# Patient Record
Sex: MALE | Race: WHITE | URBAN - METROPOLITAN AREA
[De-identification: names, ages, dates, MRNs, and addresses within clinical notes are randomized per-mention and may not be internally consistent; named-entity substitution may affect disease eponyms.]

---

## 2017-08-21 ENCOUNTER — HOSPITAL ENCOUNTER (EMERGENCY)
Facility: CLINIC | Age: 22
Discharge: HOME | End: 2017-08-21
Attending: PSYCHIATRY & NEUROLOGY | Admitting: PSYCHIATRY & NEUROLOGY
Payer: COMMERCIAL

## 2017-08-21 VITALS
WEIGHT: 150 LBS | TEMPERATURE: 98.5 F | HEART RATE: 78 BPM | DIASTOLIC BLOOD PRESSURE: 74 MMHG | RESPIRATION RATE: 16 BRPM | SYSTOLIC BLOOD PRESSURE: 127 MMHG | OXYGEN SATURATION: 97 %

## 2017-08-21 DIAGNOSIS — F31.30 BIPOLAR I DISORDER, MOST RECENT EPISODE DEPRESSED (H): ICD-10-CM

## 2017-08-21 LAB
AMPHETAMINES UR QL SCN: NEGATIVE
BARBITURATES UR QL: NEGATIVE
BENZODIAZ UR QL: NEGATIVE
CANNABINOIDS UR QL SCN: NEGATIVE
COCAINE UR QL: NEGATIVE
ETHANOL UR QL SCN: NEGATIVE
OPIATES UR QL SCN: NEGATIVE

## 2017-08-21 PROCEDURE — 80320 DRUG SCREEN QUANTALCOHOLS: CPT | Performed by: PSYCHIATRY & NEUROLOGY

## 2017-08-21 PROCEDURE — 99284 EMERGENCY DEPT VISIT MOD MDM: CPT | Mod: Z6 | Performed by: PSYCHIATRY & NEUROLOGY

## 2017-08-21 PROCEDURE — 90791 PSYCH DIAGNOSTIC EVALUATION: CPT

## 2017-08-21 PROCEDURE — 80307 DRUG TEST PRSMV CHEM ANLYZR: CPT | Performed by: PSYCHIATRY & NEUROLOGY

## 2017-08-21 PROCEDURE — 99285 EMERGENCY DEPT VISIT HI MDM: CPT | Mod: 25 | Performed by: PSYCHIATRY & NEUROLOGY

## 2017-08-21 ASSESSMENT — ENCOUNTER SYMPTOMS
HALLUCINATIONS: 0
CARDIOVASCULAR NEGATIVE: 1
EYES NEGATIVE: 1
ENDOCRINE NEGATIVE: 1
NEUROLOGICAL NEGATIVE: 1
HEMATOLOGIC/LYMPHATIC NEGATIVE: 1
GASTROINTESTINAL NEGATIVE: 1
RESPIRATORY NEGATIVE: 1
DECREASED CONCENTRATION: 1
SLEEP DISTURBANCE: 1
NERVOUS/ANXIOUS: 1
HYPERACTIVE: 0
MUSCULOSKELETAL NEGATIVE: 1
CONSTITUTIONAL NEGATIVE: 1

## 2017-08-21 NOTE — ED AVS SNAPSHOT
[facility], [place], Emergency Department    [address]    [place] [address]    Phone:  [phone]    Fax:  [phone]                                       Kameron Manning   MRN: [id number]    Department:  [facility], Emergency Department   Date of Visit:  8/21/2017           After Visit Summary Signature Page     I have received my discharge instructions, and my questions have been answered. I have discussed any challenges I see with this plan with the nurse or doctor.    ..........................................................................................................................................  Patient/Patient Representative Signature      ..........................................................................................................................................  Patient Representative Print Name and Relationship to Patient    ..................................................               ................................................  Date                                            Time    ..........................................................................................................................................  Reviewed by Signature/Title    ...................................................              ..............................................  Date                                                            Time

## 2017-08-21 NOTE — ED AVS SNAPSHOT
[facility], Emergency Department    [address]    [place] [address]    Phone:  [phone]    Fax:  [phone]                                       Kameron Manning   MRN: [id number]    Department:  [facility], Emergency Department   Date of Visit:  8/21/2017           Patient Information     Date Of Birth          1995        Your diagnoses for this visit were:     Bipolar I disorder, most recent episode depressed (H)        You were seen by Kishor Pearson MD.      Follow-up Information     Follow up with No Ref-Primary, Physician.        Discharge Instructions       Wellbutrin may be contributing to worse anxiety and restlessness. You can stop it without titrating as you have been on it for only a week.   Follow-up with Dr Howard for further med management and symptom stabilization  Follow-up with [facility]-referred therapy  Follow-up established care and services  If you are not getting better or are getting worse, please return for further evaluation. You may need to be in a more intensive day programming.    24 Hour Appointment Hotline       To make an appointment at any [facility] clinic, call [phone] ([phone]). If you don't have a family doctor or clinic, we will help you find one. [facility] clinics are conveniently located to serve the needs of you and your family.             Review of your medicines      Our records show that you are taking the medicines listed below. If these are incorrect, please call your family doctor or clinic.        Dose / Directions Last dose taken    LAMICTAL PO   Dose:  200 mg        Take 200 mg by mouth daily   Refills:  0        WELLBUTRIN PO   Dose:  150 mg        Take 150 mg by mouth daily   Refills:  0        XANAX PO   Dose:  0.25 mg        Take 0.25 mg by mouth   Refills:  0                Procedures and tests performed during your visit     Drug abuse screen 6 urine (tox)      Orders Needing Specimen Collection     None      Pending  "Results     Date and Time Order Name Status Description    2017 2251 Drug abuse screen 6 urine (tox) In process             Pending Culture Results     Date and Time Order Name Status Description    2017 2251 Drug abuse screen 6 urine (tox) In process             Pending Results Instructions     If you had any lab results that were not finalized at the time of your Discharge, you can call the ED Lab Result RN at [phone]. You will be contacted by this team for any positive Lab results or changes in treatment. The nurses are available 7 days a week from 10A to 6:30P.  You can leave a message 24 hours per day and they will return your call.        Thank you for choosing [facility]       Thank you for choosing [facility] for your care. Our goal is always to provide you with excellent care. Hearing back from our patients is one way we can continue to improve our services. Please take a few minutes to complete the written survey that you may receive in the mail after you visit with us. Thank you!        [email or web address] Information     [employer] lets you send messages to your doctor, view your test results, renew your prescriptions, schedule appointments and more. To sign up, go to [email or web address] . Click on \"Log in\" on the left side of the screen, which will take you to the Welcome page. Then click on \"Sign up Now\" on the right side of the page.     You will be asked to enter the access code listed below, as well as some personal information. Please follow the directions to create your username and password.     Your access code is: [email or web address]  Expires: 2017 11:13 PM     Your access code will  in 90 days. If you need help or a new code, please call your [facility] clinic or [phone].        Care EveryWhere ID     This is your Care EveryWhere ID. This could be used by other organizations to access your [facility] medical records  [id number]        Equal Access to Services     DL FISCHER AH: Hadii " fazal Tavarez, tawanda albrecht, linda myrick. So [facility] [phone].    ATENCIÓN: Si habla español, tiene a olivarez disposición servicios gratuitos de asistencia lingüística. Llame al [phone].    We comply with applicable federal civil rights laws and [place] laws. We do not discriminate on the basis of race, color, national origin, age, disability sex, sexual orientation or gender identity.            After Visit Summary       This is your record. Keep this with you and show to your community pharmacist(s) and doctor(s) at your next visit.

## 2017-08-22 NOTE — DISCHARGE INSTRUCTIONS
Wellbutrin may be contributing to worse anxiety and restlessness. You can stop it without titrating as you have been on it for only a week.   Follow-up with Dr Howard for further med management and symptom stabilization  Follow-up with [facility]-referred therapy  Follow-up established care and services  If you are not getting better or are getting worse, please return for further evaluation. You may need to be in a more intensive day programming.

## 2017-08-22 NOTE — ED PROVIDER NOTES
"  History     Chief Complaint   Patient presents with     Depression     \"I have bipolar and I am going through some depression stages, using poor judgement and also I am paranoid.\"     The history is provided by the patient.     Kameron Manning is a 22 year old male who is here accompanied by mother. Patient has history of being diagnosed bipolar disorder. He previously was treated for depression but Citalopram induced a manic episode and he was eventually diagnosed with bipolar disorder. There is genetic loading as mother is diagnosed bipolar. She is a physician working through the [employer] system. Patient has history of being on risperidone, but it had limited benefit. He had been on lithium but it caused diarrhea. He had a reaction to Seroquel. He was placed on Latuda but dose was only at 20 mg. 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 Mother got him connected to Dr Howard whom he met once. Patient presently is on Lamictal 200 mg. He was placed on Wellbutrin 1 week ago. Mother noted that he got more anxious, restless and confused since starting Wellbutrin. Patient does not have follow-up for 5 weeks. He is not seeing a therapist. He is open to seeing a therapist to work on coping and support. He denies drug abuse. He is not suicidal. He has no acute medical concerns. Appetite and sleep have been disrupted.    Please see [facility] Crisis Assessment on 8/21/17 in [facility] for further details.    PERSONAL MEDICAL HISTORY  Past Medical History:   Diagnosis Date     Bipolar 1 disorder (H)      PAST SURGICAL HISTORY  Past Surgical History:   Procedure Laterality Date     ORTHOPEDIC SURGERY       FAMILY HISTORY  No family history on file.  SOCIAL HISTORY  Social History   Substance Use Topics     Smoking status: Not on file     Smokeless tobacco: Not on file     Alcohol use No     MEDICATIONS  No current facility-administered medications for this encounter.      Current " Outpatient Prescriptions   Medication     BuPROPion HCl (WELLBUTRIN PO)     LamoTRIgine (LAMICTAL PO)     ALPRAZolam (XANAX PO)     ALLERGIES  No Known Allergies    I have reviewed the Medications, Allergies, Past Medical and Surgical History, and Social History in the Epic system.    Review of Systems   Constitutional: Negative.    HENT: Negative.    Eyes: Negative.    Respiratory: Negative.    Cardiovascular: Negative.    Gastrointestinal: Negative.    Endocrine: Negative.    Genitourinary: Negative.    Musculoskeletal: Negative.    Skin: Negative.    Neurological: Negative.    Hematological: Negative.    Psychiatric/Behavioral: Positive for decreased concentration and sleep disturbance. Negative for hallucinations and suicidal ideas. The patient is nervous/anxious. The patient is not hyperactive.    All other systems reviewed and are negative.      Physical Exam   BP: 143/81  Pulse: 110  Heart Rate: 110  Temp: 98.4  F (36.9  C)  Resp: 16  Weight: 68 kg (150 lb)  SpO2: 97 %  Physical Exam   Constitutional: He appears well-developed.   HENT:   Head: Normocephalic.   Eyes: Pupils are equal, round, and reactive to light.   Neck: Normal range of motion.   Cardiovascular: Normal rate.    Pulmonary/Chest: Effort normal.   Abdominal: Soft.   Musculoskeletal: Normal range of motion.   Neurological: He is alert.   Skin: Skin is warm.   Psychiatric: His speech is normal and behavior is normal. Judgment and thought content normal. His mood appears anxious. His affect is blunt. He is not agitated, not aggressive, not hyperactive, not actively hallucinating and not combative. Thought content is not paranoid and not delusional. Cognition and memory are normal. He expresses no homicidal and no suicidal ideation.   Nursing note and vitals reviewed.      ED Course     ED Course     Procedures    Labs Ordered and Resulted from Time of ED Arrival Up to the Time of Departure from the ED   DRUG ABUSE SCREEN 6 CHEM DEP URINE ([facility])             Assessments & Plan (with Medical Decision Making)   Patient with bipolar disorder whose symptoms are worse likely triggered by addition of Wellbutrin. Patient agrees to stop the Wellbutrin. He plans on following up with his psychiatrist for further med management. [facility] made a referral for therapy. He is to follow-up established care and services.    I have reviewed the nursing notes.    I have reviewed the findings, diagnosis, plan and need for follow up with the patient.    New Prescriptions    No medications on file       Final diagnoses:   Bipolar I disorder, most recent episode depressed (H)       8/21/2017   [facility], [place], EMERGENCY DEPARTMENT     Kishor Pearson MD  08/21/17 3171

## 2017-08-22 NOTE — ED NOTES
Patient arrives to [facility]. Psych Associate explains process, gives patient urine cup and questionnaire. Patient told about meeting with Mental Health  and Psychiatrist. Patient told about 2-5 hour time frame for complete evaluation.